# Patient Record
Sex: MALE | Race: BLACK OR AFRICAN AMERICAN | NOT HISPANIC OR LATINO | Employment: OTHER | URBAN - METROPOLITAN AREA
[De-identification: names, ages, dates, MRNs, and addresses within clinical notes are randomized per-mention and may not be internally consistent; named-entity substitution may affect disease eponyms.]

---

## 2021-03-17 ENCOUNTER — LAB VISIT (OUTPATIENT)
Dept: PRIMARY CARE CLINIC | Facility: OTHER | Age: 36
End: 2021-03-17
Payer: MEDICAID

## 2021-03-17 DIAGNOSIS — Z20.822 ENCOUNTER FOR LABORATORY TESTING FOR COVID-19 VIRUS: ICD-10-CM

## 2021-03-17 PROCEDURE — U0003 INFECTIOUS AGENT DETECTION BY NUCLEIC ACID (DNA OR RNA); SEVERE ACUTE RESPIRATORY SYNDROME CORONAVIRUS 2 (SARS-COV-2) (CORONAVIRUS DISEASE [COVID-19]), AMPLIFIED PROBE TECHNIQUE, MAKING USE OF HIGH THROUGHPUT TECHNOLOGIES AS DESCRIBED BY CMS-2020-01-R: HCPCS | Performed by: INTERNAL MEDICINE

## 2021-03-19 LAB — SARS-COV-2 RNA RESP QL NAA+PROBE: NOT DETECTED

## 2022-01-05 PROBLEM — B18.2 CHRONIC HEPATITIS C WITHOUT HEPATIC COMA: Status: ACTIVE | Noted: 2022-01-05

## 2022-01-24 ENCOUNTER — SPECIALTY PHARMACY (OUTPATIENT)
Dept: PHARMACY | Facility: CLINIC | Age: 37
End: 2022-01-24
Payer: MEDICAID

## 2022-01-24 NOTE — TELEPHONE ENCOUNTER
Used medicaid eligibility  website and found active coverage. AG Epclusa rejecting stating last fill 1/5/22. Called plan and spoke with Maria Isabel who reports AG Epclusa was last filled for #28 on 1/5/22 by [facility] ([phone]).     Called pharmacy and spoke with pharmacist Zak who reports they filled AG Epclusa month #1 on 1/5/22 and we reviewed that the patient should receive #84 days total of therapy. Zak confirmed his RX will reflect the appropriate treatment duration, and they will continue to fill for the patient, as he is staying in a long term care/nursing home facility.     Discussed acid management medications with Zak as well. He reports that the patient has active orders for BOTH famotidine BID and pantoprazole BID. The most recent RX for pantoprazole was filled on 1/22 and was due to be delivered to the nursing home today. Requested Zak DC the order for pantoprazole, and continue with filling ONLY famotidine as requested by MD during office visit on 1/5/22.     Called [facility] at [phone] to review acid management with nursing staff.  states that nursing staff are not available at this time, and she will request they call me back to discuss. Stressed that this is an urgent matter to avoid further drug interactions. At call back, will alert nursing staff to HOLD pantoprazole for the duration of Epclusa treatment. Will also alert nursing staff to give famotidine 20 mg WITH Epclusa and EXACTLY 12 hours after Epclusa only.     Updated provider to the status of treatment and will close patient out of [facility] services once the nursing home reaches back out to discuss DDIs.

## 2022-01-24 NOTE — TELEPHONE ENCOUNTER
[facility].  Reviewed DDIs- HOLD pantoprazole for the duration of Epclusa treatment. Will also alert nursing staff to give famotidine 20 mg WITH Epclusa and EXACTLY 12 hours after Epclusa only.     Protonix is no longer on pts med list.    AM Famoitidine will be given at same time as Epclusa (5 AM).  PM Famotidine will be given at 5 PM.     Routing to assigned [facility].

## 2022-03-29 PROBLEM — Z93.1 STATUS POST INSERTION OF PERCUTANEOUS ENDOSCOPIC GASTROSTOMY (PEG) TUBE: Status: ACTIVE | Noted: 2022-03-29
